# Patient Record
(demographics unavailable — no encounter records)

---

## 2024-10-20 NOTE — REVIEW OF SYSTEMS
[Fatigue] : fatigue [Cough] : no cough [Sputum] : no sputum [Dyspnea] : dyspnea [Negative] : Hematologic [TextBox_30] : improving dyspnea [TextBox_119] : allegedly has new tremor

## 2024-10-20 NOTE — HISTORY OF PRESENT ILLNESS
[TextBox_4] : bout with covid-  and feeling tired   two months- sicne.  but slowly getting better he had no dyspnea before the surgery and did not have pulmonary hypertension by echo despite aortic stenosis and aortic aneuryssm has had coronary repair, aneurysm and stenosis as well and is slowly getting better only sligt obstructive diseae, doubt inhaler would make any different a myriad of comorbidities adds up to dyspnea and he is slowly improving

## 2024-10-20 NOTE — DISCUSSION/SUMMARY
[FreeTextEntry1] : he neyda slowly improve over time with exercise, diet and time no need for futher pulmonary follow up at this time

## 2024-10-20 NOTE — REASON FOR VISIT
[Follow-Up] : a follow-up visit [TextBox_44] : patient here for follow up from dyspnea after cardiac surgery

## 2024-10-20 NOTE — PROCEDURE
[FreeTextEntry1] : spirometry with mild restriction unchanged his pfts had shown a mild diffusion deficit

## 2024-10-24 NOTE — PHYSICAL EXAM
[FreeTextEntry1] : Alert. Fully oriented. Speech and language are intact. Cranial nerves II-XII are intact. Mild L NLFF No nystagmus. Motor exam reveals intact strength with individual muscle testing in bilateral upper and lower extremities. Tone is normal. Reflexes are normal. Sensation is intact to light touch in distal extremities. Finger-to-nose is intact. Rapid alternating movements are normal in the upper and lower extremities. Gait is normal. Ambulates independently   L hand essential tremors most noticeable when he is holding up something

## 2024-10-24 NOTE — HISTORY OF PRESENT ILLNESS
[FreeTextEntry1] : interval follow up: Feels like his memory is stable and he now notes essential tremor. had Covid 2 months ago and still feels fatigue.  walks 5 days a week.  Mood is stable. tolerating Effexor 150 mg He is socially active  tremor is worse     HPI: Qamar Brewer is a 78 year old male with PMH of AF x2-3 years on Xarelto, severe AS and ascending aortic aneurysm, HFpEF, HTN, HLD, PVD, BPH, gout, IBS, GERD and vertigo who underwent uncomplicated AVR, hemiarch replacement, encompass cryomaze and COLLIN with Dr. Zhang who experienced an episode of slurred speech and double vision on POD4 with MRI confirming small frontal lobe stroke. He presents to the office for follow-up on PET scan results.  Since last visit, he reports increased in fatigue and wife states he seems more depressed and sleeping a lot. He had covid last month and was treated with Paxlovid. He feels like he has not recovered from fatigue. He wakes up with fatigue, takes naps during the day. Because of this, he has been walking less. Still participates 2x week with PT at [place]. remains on the same medications.   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  Note, given parieto-occipital and lateral temporal hypometabolism which extends into the mesial occipital regions, mixed dementia, with Lewy body co-pathology, should be considered in the proper clinical setting.  Labs:  08/16/24: TSH 3.79 5/13/24 LDL: 52, Cholesterol:112 03/3/2024: A1c: 5.7%   Interval history Patient is feeling okay, does not report any new stroke-like symptoms. Currently on Xarelto 15mg PO QAM and Plavix 75mg QPM. Atorvastatin 80mg PO Qday. Denies bleeding episodes or myalgias. BP today is 120/72. Denies lightheadedness or dizziness. Denies presyncope episodes. Patient still complains of getting easily fatigued, occasional feeling of out of breath but improved from last visit. Had cardiac stents placed by Dr. Cardoso April 29, 2024. Also saw pulmonologist, Dr. Ferreira in April and PFTs done which appears to be normal. He is attending cardiac rehab for total of four sessions and walks 3-4x a week for 35 minutes each time. Appetite is good. Keeping note of his diet, limiting sodium and sugar intake.  Reports forgetfulness, short term memory is more affected. This is not new but feels like getting worse. Still on donepezil 10mg PO every day. 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  L hand tremors noticed approximately 2 months ago. More noticeable when he is holding up his phone. He is right-handed. Denies family history of tremors.

## 2024-10-24 NOTE — ASSESSMENT
[FreeTextEntry1] : Qamar Brewer is a 78 year old male with PMH of AF x2-3 years on Xarelto, severe AS and ascending aortic aneurysm, HFpEF, HTN, HLD, PVD, BPH, gout, IBS, GERD and vertigo who underwent uncomplicated AVR, hemiarch replacement, encompass cryomaze and COLLIN with Dr. Zhang who experienced an episode of slurred speech and double vision on POD4 with MRI confirming small frontal lobe stroke. He presents to the office for follow-up on PET scan results.   Plan: -For memory, increase Donepezil to 20mg. Will repeat MOCA and PET FDG next year - For depression, cont [id number] mg daily Essential tremor: mild. Patient does not want treatment. -Continue Xarelto 15mg  Atorvastatin 80mg PO daily.  -Continue aggressive vascular risk factor control with PCP, BP goal <140/90, LDL goal <70 -Counselled on healthy eating (DASH/Mediterranean diet, limiting red meats and fried foods) -Counselled on f/u with PCP regarding regular health maintenance and prevention, including routine screening -Counselled on signs of stroke BEFAST and to call 911 with any new or worsening neurological symptoms -Continue habits to stimulate the mind including puzzles, reading, regular exercise and at least 8 hours of sleep per night -RTC in 6 months

## 2024-12-20 NOTE — HISTORY OF PRESENT ILLNESS
[FreeTextEntry1] : The patient is complaining of severe fatigue.  He sleeps 10 hours at night and then takes long naps.  He cycles 15 minutes and walks on the treadmill for 1.5 miles.  He has dyspnea climbing 1 flight of stairs but does not stop.  Patient has dyspnea at rest.  He has also had nausea in the morning.  He had an eye exam more than 2 years ago.  He had colonoscopy 3 years ago.  He had a COVID-vaccine last year.  He has nocturia twice at night.

## 2024-12-20 NOTE — DISCUSSION/SUMMARY
[FreeTextEntry1] : The patient has severe fatigue.  He is sleeping too much.  He will set an alarm and reduce his length of sleep.  He will discuss this with the neurologist and or seek out a psychiatrist.  Patient has a history of an abnormal JAIME.  Today it is normal.  EKG shows prolonged first-degree heart block and inferolateral ST depression.  This was present on a prior EKG.  The patient is describing dyspnea but his exercise tolerance is fairly good for his age.  He will see a gastroenterologist for his nausea.  He will get an eye exam.  He will see the ENT for his hearing.  The patient will contact me what the names of his other providers. He is not having any cognitive impairment. He has no significant current or past depression. He is fully functional and there are no safety issues. He will be screened for eye examination, and Immunizations over the next 10 years. The patient was furnished with personalized health advice and does not need referral to health education or preventative counseling services. Patient does not need advanced care planning services.  Laboratory testing was done.

## 2024-12-20 NOTE — PHYSICAL EXAM
[Well Developed] : well developed [Well Nourished] : well nourished [No Acute Distress] : no acute distress [No Carotid Bruit] : no carotid bruit [Normal S1, S2] : normal S1, S2 [Clear Lung Fields] : clear lung fields [Good Air Entry] : good air entry [No Respiratory Distress] : no respiratory distress  [Normal Gait] : normal gait [Normal DP B/L] : normal DP B/L [Moves all extremities] : moves all extremities [No Focal Deficits] : no focal deficits [Normal Speech] : normal speech [Alert and Oriented] : alert and oriented [Normal memory] : normal memory [de-identified] : Moderately decreased hearing bilaterally.  TMs normal [de-identified] : 3/6 systolic ejection murmur [de-identified] : .  No redness, heat or warmth   [de-identified] : trace edema bilaterally

## 2025-04-03 NOTE — PHYSICAL EXAM
[Well Developed] : well developed [Well Nourished] : well nourished [No Murmur] : no murmur [Clear Lung Fields] : clear lung fields [Good Air Entry] : good air entry [No Respiratory Distress] : no respiratory distress  [Normal Gait] : normal gait [No Edema] : no edema [Moves all extremities] : moves all extremities [No Focal Deficits] : no focal deficits [Normal Speech] : normal speech [Alert and Oriented] : alert and oriented [Normal memory] : normal memory

## 2025-04-06 NOTE — DISCUSSION/SUMMARY
[EKG obtained to assist in diagnosis and management of assessed problem(s)] : EKG obtained to assist in diagnosis and management of assessed problem(s) [FreeTextEntry1] : 80 year old male with PMHx of CAD (SARAH pLAD 4/2024), HTN, HLD, HFmrEF (EF 55-60% on echo 8/2024), Afib (on Xarelto ) severe AS and ascending aortic aneurysm s/p bioAVR with ascending hemiarch replacement, cryomaze, and COLLIN occlusion (2/13/24, [place]), CVA (2/21/24), Stage 3b CKD (baseline Cr 1.5-1.7), PVD, IBS, BPH, GERD, Gout, Vertigo presents for follow up. Accompanied by his wife.  EKG NSR 68 bpm, 1st degree block, PVCs  CAD: - No exertional chest pain, excellent exercise tolerance.  - Successful stent placement pLAD in 4/2024.  - Unable to reach target heart rate on last stress, will attempt stress echo to asses degress of cad  HLD - LDL goal < 70, most recent LDL 52, controlled.  - Continue cardiac risk factor optimization and Atorvastatin 80.   HTN:  - BP goal < 130/80, mildly elevated in office and home - Labs sent, consider increasing Lisinopril to 10mg vs Farxiga  - Diet and exercise discussed  AS s/p AVR:  - Echocardiogram done today revealed bioprosthetic valve in appropriate position and normal function. EF normal - Continue to monitor with annual echocardiogram.  Tremor:  - Benign essential tremor of bilateral hands.  - Continue follow up with neurologist. Has follow up with Dr Car in August  I have discussed the case with NEERAJ Kerr. I have personally performed a history, physical exam, and my own medical decision making. I have reviewed the note and agree with the findings and plan.   The patient is status post aortic valve replacement and thoracic aortic repair.  He is improving.  He has dyspnea with fairly heavy exercise.  EKG shows normal sinus rhythm and first-degree heart block.  The echocardiogram reveals a prosthetic aortic valve with good function and an ejection fraction of 60%.  The blood pressure is elevated.  He has proteinuria.  He will start Farxiga.  We will also increase his lisinopril soon.

## 2025-04-06 NOTE — HISTORY OF PRESENT ILLNESS
[FreeTextEntry1] : 80 year old male with PMHx of CAD (SARAH pLAD 4/2024), HTN, HLD, HFmrEF (EF 55-60% on echo 8/2024), Afib (on Xarelto ) severe AS and ascending aortic aneurysm s/p bioAVR with ascending hemiarch replacement, cryomaze, and COLLIN occlusion (2/13/24, [place]), CVA on MRI (2/21/24), Stage 3b CKD (baseline Cr 1.5-1.7), PVD, IBS, BPH, GERD, Gout, Vertigo presents for follow up. Accompanied by his wife.  last seen 12/2024  Since last visit, overall feeling well. He has some improvement in his fatigue. He does believe he has increased with activity.  He remains to get out of breath if carrying heavy objects upstairs. He feels this is an improvement since his surgery. He has increased his walking and speed to 3 times a week up to 2 miles. No complications. No chest discomfort. No edema, recent illnesses or bleeding.   He his attempting to cut back on his naps. But otherwise sleeping well.   At home blood pressures has increased slightly to 140's / mid 80'smmHg. He has gained about 3-4 lbs.   He still has hand tremors with activity. He has a follow up with Dr Car in August. Patient denies any palpitations, shortness of breath at rest, dizziness, falls, syncope or neuro focal deficits. No edema.  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 Elevation and mamillation of the left hemidiaphragm. No significant interval change  Echo (8/2024): 1. Left ventricular cavity is small. Left ventricular wall thickness is normal. Left ventricular systolic function is normal with an ejection fraction visually estimated at 55 to 60 %. There are no regional wall motion abnormalities seen. 2. The left ventricular diastolic function is indeterminate. 3. Borderline normal right ventricular systolic function. Size appears normal. 4. Left atrium is severely dilated. 5. The right atrium is dilated. 6. A bioprosthetic valve replacement valve replacement is present in the aortic position The prosthetic valve has normal function. No prosthetic aortic stenosis. No intravalvular regurgitation. 7. The peak transaortic velocity is 1.56 m/s, peak transaortic gradient is 9.7 mmHg and mean transaortic gradient is 5.4 mmHg with an LVOT/aortic valve VTI ratio of 0.72. The effective orifice area is estimated at 2.65 cm by the continuity equation. 8. Probably moderate, very eccentric, posteriorly directed mitral regurgitation. 9. There is moderate posterior calcification of the mitral valve annulus. Anterior leaflet tip calcification noted. 10. Mild tricuspid regurgitation. 11. Estimated pulmonary artery systolic pressure is 30 mmHg, consistent with normal pulmonary artery pressure. 12. No pericardial effusion seen. 13. Aortic root at the sinuses of Valsalva is dilated, measuring 3.90 cm (indexed 2.08 cm/m). 14. Consider transesophageal echocardiogram for further assessment of mitral valve.  Stress echo (5/2024): 1. No chest discomfort. 2. No exercise induced electrocardiographic evidence of myocardial ischemia. 3. No echocardiographic evidence of exercise induced ischemia. 4. Normal submaximal exercise stress echocardiogram with normal augmentation of left ventricular systolic function.  Cath (4/2024): successful stent pLAD  Carotid US (3/2024): mild wall thickening of intima at bilateral CCA and mild calcified plaque at right bulb without hemodynamically significant stenosis  Cath (2/2024): diagnostic cath - 1vCAD of pLAD bifurcating 60-70% stenosis with D1  HI (2/2024): 1. Normal left and right ventricular size and systolic function. 2. No LA/COLLIN thrombus seen. 3. Moderate-to-severe aortic regurgitation. 4. Moderate aortic stenosis. 5. Severely dilated ascending aorta. prox 5.56 cm, moderate plaque in descending aorta 6. No pericardial effusion.  Echo (2/2024): 1. Mild symmetric left ventricular hypertrophy. 2. Mildly reduced left ventricular systolic function. 3. Normal right ventricular size. 4. Probably normal right ventricular systolic function. 5. Severely dilated left atrium. 6. Mild mitral regurgitation. 7. A bioprosthetic valve noted in the aortic position which appears to be functioning normally. There is no evidence of aortic regurgitation. 8. No pericardial effusion. aortic root 3.5 cm  CTA (1/2024):Aortic valvular calcification. 5.4 cm aneurysmal dilatation of ascending aorta. Patent abdominal aorta and iliac arteries.  Abdominal aorta US (12/2023):1. Mildly dilated abdominal Aorta diameters with evidence of mild fibrocalcific plaque throughout. No evidence of abdominal aortic aneurysm (AAA). 2. Patent aorta, common iliac, and external iliac arteries with biphasic flow bilaterally.

## 2025-04-06 NOTE — REVIEW OF SYSTEMS
[Dyspnea on exertion] : dyspnea during exertion [Fever] : no fever [Chills] : no chills [SOB] : no shortness of breath [Chest Discomfort] : no chest discomfort [Lower Ext Edema] : no extremity edema [Palpitations] : no palpitations [Syncope] : no syncope [Abdominal Pain] : no abdominal pain [Nausea] : no nausea [Vomiting] : no vomiting [Heartburn] : no heartburn [Dysphagia] : no dysphagia [Diarrhea] : diarrhea [Constipation] : no constipation [Dizziness] : no dizziness [Numbness (Hypoesthesia)] : no numbness [Weakness] : no weakness [Speech Disturbance] : no speech disturbance [Easy Bleeding] : no tendency for easy bleeding

## 2025-04-14 NOTE — ASSESSMENT
[FreeTextEntry1] : I have reviewed the patient's medical records, diagnostic images during the time of this office consultation and have made the following recommendation. CT and TTE completed of the thoracic aorta. The report was reviewed and noted in the chart, I independently reviewed and interpreted images and reports, and I reviewed the films/CD and agree. The surgical repair is intact and stable.  Plan  - Follow up [facility] in ___ with ____. - Continue medication regimen. - Follow up with cardiologist and PCP. - Blood pressure management. - Discussed signs and symptoms that warrant emergency medical attention.

## 2025-04-25 NOTE — END OF VISIT
[Time Spent: ___ minutes] : I have spent [unfilled] minutes of time on the encounter which excludes teaching and separately reported services. [FreeTextEntry3] : I, YAN Navarro personally performed the evaluation and management (E/M) services for this established patient who presents today with (a) new problem(s)/exacerbation of (an) existing condition(s).  That E/M includes conducting the clinically appropriate interval history &/or exam, assessing all new/exacerbated conditions, and establishing a new plan of care.  Today, my VANDA, was here to observe &/or participate in the visit & follow plan of care established by me.

## 2025-04-25 NOTE — HISTORY OF PRESENT ILLNESS
[FreeTextEntry1] : 78 year old male with a past medical history of Afib x 2-3 years (on Xarelto), HTN, HLD, BPH, GERD, vertigo, some memory loss (A &O x 3), chronic diastolic heart failure with severe aortic stenosis and ascending aortic aneurysm now status post t-AVR (27 mm bio valve), ascending, hemiarch replacement, encompass cryomaze, and COLLIN occlusion on 2/13/24, postop course complicated by CVA, presents for one year follow up visit with repeat diagnostic imaging.   TTE 4/3/2025 1. Left ventricular cavity is normal in size. Left ventricular wall thickness is mildly increased. Left ventricular systolic function is normal with an ejection fraction of 60 %. There are no regional wall motion abnormalities seen. 2. Moderate left ventricular hypertrophy. 3. Normal right ventricular cavity size, with normal wall thickness, and normal right ventricular systolic function. 4. Left atrium is severely dilated. 5. Mild mitral valve leaflet calcification. 6. A bioprosthetic valve replacement valve replacement is present in the aortic position The prosthetic valve has normal leaflet excursion and is well seated. No prosthetic aortic stenosis. 7. Patient is S/P thoracic aorta repair. 8. No evidence of aortic regurgitation.  CTA chest 04/09/25 Interval ascending aortic and aortic valve replacement without postoperative complication.  Patient is doing well and denies recent hospitalization, ER visits, or surgeries. He  denies fever, chills, fatigue, headache, blurred vision, dizziness, syncope, chest pain, palpitations, shortness of breath, orthopnea, paroxysmal nocturnal dyspnea, nausea, vomiting, abdominal pain, back pain, BRBPR or swelling to legs.

## 2025-04-25 NOTE — ASSESSMENT
[FreeTextEntry1] : I have reviewed the patient's medical records, diagnostic images during the time of this office consultation and have made the following recommendation. CT and TTE completed of the thoracic aorta. The report was reviewed and noted in the chart, I independently reviewed and interpreted images and reports, and I reviewed the films/CD and agree. The surgical repair is intact and stable.  Plan  - Follow up in [facility] in 2 years with TTE and CTA CHEST. - Continue medication regimen. - Follow up with cardiologist and PCP. - Blood pressure management. - Discussed signs and symptoms that warrant emergency medical attention.

## 2025-04-25 NOTE — HISTORY OF PRESENT ILLNESS
[FreeTextEntry1] : 78 year old male with a past medical history of Afib x 2-3 years (on Xarelto), HTN, HLD, BPH, GERD, vertigo, some memory loss (A &O x 3), chronic diastolic heart failure with severe aortic stenosis and ascending aortic aneurysm now status post t-AVR (27 mm bio valve), ascending, hemiarch replacement, encompass cryomaze, and COLLIN occlusion on 2/13/24, postop course complicated by CVA, presents for one year follow up visit with repeat diagnostic imaging.   TTE 4/3/2025 1. Left ventricular cavity is normal in size. Left ventricular wall thickness is mildly increased. Left ventricular systolic function is normal with an ejection fraction of 60 %. There are no regional wall motion abnormalities seen. 2. Moderate left ventricular hypertrophy. 3. Normal right ventricular cavity size, with normal wall thickness, and normal right ventricular systolic function. 4. Left atrium is severely dilated. 5. Mild mitral valve leaflet calcification. 6. A bioprosthetic valve replacement valve replacement is present in the aortic position The prosthetic valve has normal leaflet excursion and is well seated. No prosthetic aortic stenosis. 7. Patient is S/P thoracic aorta repair. 8. No evidence of aortic regurgitation.  CTA chest 04/09/25 Interval ascending aortic and aortic valve replacement without postoperative complication.  Patient is doing well and denies recent hospitalization, ER visits, or surgeries. He  denies fever, chills, fatigue, headache, blurred vision, dizziness, syncope, chest pain, palpitations, shortness of breath, orthopnea, paroxysmal nocturnal dyspnea, nausea, vomiting, abdominal pain, back pain, BRBPR or swelling to legs. To get better and follow your care plan as instructed.